# Patient Record
Sex: FEMALE | Race: WHITE | NOT HISPANIC OR LATINO | URBAN - METROPOLITAN AREA
[De-identification: names, ages, dates, MRNs, and addresses within clinical notes are randomized per-mention and may not be internally consistent; named-entity substitution may affect disease eponyms.]

---

## 2017-06-09 ENCOUNTER — OFFICE VISIT - HEALTHEAST (OUTPATIENT)
Dept: FAMILY MEDICINE | Facility: CLINIC | Age: 32
End: 2017-06-09

## 2017-06-09 DIAGNOSIS — N76.0 ACUTE VAGINITIS: ICD-10-CM

## 2017-06-09 ASSESSMENT — MIFFLIN-ST. JEOR: SCORE: 1330.01

## 2017-08-26 ENCOUNTER — OFFICE VISIT - HEALTHEAST (OUTPATIENT)
Dept: FAMILY MEDICINE | Facility: CLINIC | Age: 32
End: 2017-08-26

## 2017-08-26 DIAGNOSIS — N76.0 BACTERIAL VAGINOSIS: ICD-10-CM

## 2017-08-26 DIAGNOSIS — B96.89 BACTERIAL VAGINOSIS: ICD-10-CM

## 2017-08-26 DIAGNOSIS — J32.9 SINUSITIS: ICD-10-CM

## 2017-09-13 ENCOUNTER — COMMUNICATION - HEALTHEAST (OUTPATIENT)
Dept: FAMILY MEDICINE | Facility: CLINIC | Age: 32
End: 2017-09-13

## 2017-12-28 ENCOUNTER — OFFICE VISIT - HEALTHEAST (OUTPATIENT)
Dept: MIDWIFE SERVICES | Facility: CLINIC | Age: 32
End: 2017-12-28

## 2017-12-28 ENCOUNTER — COMMUNICATION - HEALTHEAST (OUTPATIENT)
Dept: MIDWIFE SERVICES | Facility: CLINIC | Age: 32
End: 2017-12-28

## 2017-12-28 DIAGNOSIS — B37.31 YEAST VAGINITIS: ICD-10-CM

## 2017-12-28 DIAGNOSIS — N89.8 VAGINAL IRRITATION: ICD-10-CM

## 2017-12-28 DIAGNOSIS — Z30.431 IUD CHECK UP: ICD-10-CM

## 2017-12-28 ASSESSMENT — MIFFLIN-ST. JEOR: SCORE: 1325.02

## 2018-03-13 ENCOUNTER — OFFICE VISIT - HEALTHEAST (OUTPATIENT)
Dept: FAMILY MEDICINE | Facility: CLINIC | Age: 33
End: 2018-03-13

## 2018-03-13 DIAGNOSIS — N76.0 BACTERIAL VAGINOSIS: ICD-10-CM

## 2018-03-13 DIAGNOSIS — B96.89 BACTERIAL VAGINOSIS: ICD-10-CM

## 2018-03-13 DIAGNOSIS — N89.8 VAGINAL DISCHARGE: ICD-10-CM

## 2018-03-13 LAB
CLUE CELLS: ABNORMAL
TRICHOMONAS, WET PREP: ABNORMAL
YEAST, WET PREP: ABNORMAL

## 2018-03-13 ASSESSMENT — MIFFLIN-ST. JEOR: SCORE: 1338.18

## 2018-03-17 LAB — BACTERIA SPEC CULT: NORMAL

## 2018-06-15 ENCOUNTER — COMMUNICATION - HEALTHEAST (OUTPATIENT)
Dept: FAMILY MEDICINE | Facility: CLINIC | Age: 33
End: 2018-06-15

## 2018-10-29 ENCOUNTER — RECORDS - HEALTHEAST (OUTPATIENT)
Dept: ADMINISTRATIVE | Facility: OTHER | Age: 33
End: 2018-10-29

## 2018-11-07 ENCOUNTER — OFFICE VISIT - HEALTHEAST (OUTPATIENT)
Dept: FAMILY MEDICINE | Facility: CLINIC | Age: 33
End: 2018-11-07

## 2018-11-07 DIAGNOSIS — Z00.00 ROUTINE GENERAL MEDICAL EXAMINATION AT A HEALTH CARE FACILITY: ICD-10-CM

## 2018-11-07 LAB
ANION GAP SERPL CALCULATED.3IONS-SCNC: 9 MMOL/L (ref 5–18)
BUN SERPL-MCNC: 12 MG/DL (ref 8–22)
CALCIUM SERPL-MCNC: 9.7 MG/DL (ref 8.5–10.5)
CHLORIDE BLD-SCNC: 105 MMOL/L (ref 98–107)
CHOLEST SERPL-MCNC: 190 MG/DL
CO2 SERPL-SCNC: 25 MMOL/L (ref 22–31)
CREAT SERPL-MCNC: 0.76 MG/DL (ref 0.6–1.1)
ERYTHROCYTE [DISTWIDTH] IN BLOOD BY AUTOMATED COUNT: 10.6 % (ref 11–14.5)
FASTING STATUS PATIENT QL REPORTED: YES
GFR SERPL CREATININE-BSD FRML MDRD: >60 ML/MIN/1.73M2
GLUCOSE BLD-MCNC: 86 MG/DL (ref 70–125)
HCT VFR BLD AUTO: 42.1 % (ref 35–47)
HDLC SERPL-MCNC: 90 MG/DL
HGB BLD-MCNC: 14.3 G/DL (ref 12–16)
LDLC SERPL CALC-MCNC: 91 MG/DL
MCH RBC QN AUTO: 31.6 PG (ref 27–34)
MCHC RBC AUTO-ENTMCNC: 34.1 G/DL (ref 32–36)
MCV RBC AUTO: 93 FL (ref 80–100)
PLATELET # BLD AUTO: 164 THOU/UL (ref 140–440)
PMV BLD AUTO: 7.7 FL (ref 7–10)
POTASSIUM BLD-SCNC: 4.4 MMOL/L (ref 3.5–5)
RBC # BLD AUTO: 4.53 MILL/UL (ref 3.8–5.4)
SODIUM SERPL-SCNC: 139 MMOL/L (ref 136–145)
TRIGL SERPL-MCNC: 43 MG/DL
TSH SERPL DL<=0.005 MIU/L-ACNC: 1.22 UIU/ML (ref 0.3–5)
WBC: 5.8 THOU/UL (ref 4–11)

## 2018-11-07 ASSESSMENT — MIFFLIN-ST. JEOR: SCORE: 1308.7

## 2018-11-08 LAB
25(OH)D3 SERPL-MCNC: 48.6 NG/ML (ref 30–80)
25(OH)D3 SERPL-MCNC: 48.6 NG/ML (ref 30–80)

## 2018-11-16 ENCOUNTER — OFFICE VISIT - HEALTHEAST (OUTPATIENT)
Dept: FAMILY MEDICINE | Facility: CLINIC | Age: 33
End: 2018-11-16

## 2018-11-16 DIAGNOSIS — J06.9 UPPER RESPIRATORY TRACT INFECTION, UNSPECIFIED TYPE: ICD-10-CM

## 2018-11-16 DIAGNOSIS — J01.90 ACUTE SINUSITIS WITH SYMPTOMS > 10 DAYS: ICD-10-CM

## 2019-03-18 ENCOUNTER — OFFICE VISIT - HEALTHEAST (OUTPATIENT)
Dept: FAMILY MEDICINE | Facility: CLINIC | Age: 34
End: 2019-03-18

## 2019-03-18 DIAGNOSIS — B96.89 ACUTE BACTERIAL SINUSITIS: ICD-10-CM

## 2019-03-18 DIAGNOSIS — J01.90 ACUTE BACTERIAL SINUSITIS: ICD-10-CM

## 2019-10-02 ENCOUNTER — ANESTHESIA - HEALTHEAST (OUTPATIENT)
Dept: SURGERY | Facility: CLINIC | Age: 34
End: 2019-10-02

## 2019-10-02 ENCOUNTER — SURGERY - HEALTHEAST (OUTPATIENT)
Dept: SURGERY | Facility: CLINIC | Age: 34
End: 2019-10-02

## 2019-10-02 ASSESSMENT — MIFFLIN-ST. JEOR: SCORE: 1481.07

## 2020-01-21 ENCOUNTER — OFFICE VISIT - HEALTHEAST (OUTPATIENT)
Dept: FAMILY MEDICINE | Facility: CLINIC | Age: 35
End: 2020-01-21

## 2020-01-21 ENCOUNTER — COMMUNICATION - HEALTHEAST (OUTPATIENT)
Dept: FAMILY MEDICINE | Facility: CLINIC | Age: 35
End: 2020-01-21

## 2020-01-21 DIAGNOSIS — H01.009 BLEPHARITIS, UNSPECIFIED LATERALITY, UNSPECIFIED TYPE: ICD-10-CM

## 2020-10-06 ENCOUNTER — COMMUNICATION - HEALTHEAST (OUTPATIENT)
Dept: FAMILY MEDICINE | Facility: CLINIC | Age: 35
End: 2020-10-06

## 2020-10-09 ENCOUNTER — AMBULATORY - HEALTHEAST (OUTPATIENT)
Dept: NURSING | Facility: CLINIC | Age: 35
End: 2020-10-09

## 2021-05-31 VITALS — BODY MASS INDEX: 19.26 KG/M2 | WEIGHT: 127.1 LBS | HEIGHT: 68 IN

## 2021-05-31 VITALS — WEIGHT: 128 LBS | BODY MASS INDEX: 19.46 KG/M2

## 2021-05-31 VITALS — WEIGHT: 128.2 LBS | BODY MASS INDEX: 19.43 KG/M2 | HEIGHT: 68 IN

## 2021-06-01 VITALS — HEIGHT: 68 IN | WEIGHT: 130 LBS | BODY MASS INDEX: 19.7 KG/M2

## 2021-06-01 NOTE — ANESTHESIA PROCEDURE NOTES
Spinal Block    Patient location during procedure: OR  Start time: 10/2/2019 9:42 AM  End time: 10/2/2019 9:46 AM  Reason for block: primary anesthetic    Staffing:  Performing  Anesthesiologist: Deric Hernandez MD    Preanesthetic Checklist  Completed: patient identified, risks, benefits, and alternatives discussed, timeout performed, consent obtained, airway assessed, oxygen available, suction available, emergency drugs available and hand hygiene performed  Spinal Block  Patient position: sitting  Prep: ChloraPrep  Patient monitoring: heart rate, cardiac monitor, continuous pulse ox and blood pressure  Approach: midline  Location: L2-3  Injection technique: single-shot  Needle type: pencil-tip   Needle gauge: 24 G

## 2021-06-01 NOTE — ANESTHESIA PREPROCEDURE EVALUATION
Anesthesia Evaluation      No history of anesthetic complications     Airway   Mallampati: II  Neck ROM: full   Pulmonary - negative ROS and normal exam                          Cardiovascular - normal exam   Neuro/Psych - negative ROS     Endo/Other    (+) pregnant     GI/Hepatic/Renal - negative ROS           Dental - normal exam                        Anesthesia Plan  Planned anesthetic: spinal    ASA 2     Anesthetic plan and risks discussed with: patient    Post-op plan: routine recovery    Plan placement of bilateral TAP blocks at conclusion of procedure for postoperative pain control per surgeon request.

## 2021-06-01 NOTE — ANESTHESIA CARE TRANSFER NOTE
Last vitals:   Vitals:    10/02/19 1058   BP: 122/61   Pulse: 78   Resp: 16   Temp: 36.4  C (97.5  F)   SpO2: 99%     Patient's level of consciousness is awake  Spontaneous respirations: yes  Maintains airway independently: yes  Dentition unchanged: yes  Oropharynx: oropharynx clear of all foreign objects    QCDR Measures:  ASA# 20 - Surgical Safety Checklist: WHO surgical safety checklist completed prior to induction    PQRS# 430 - Adult PONV Prevention: 4558F - Pt received => 2 anti-emetic agents (different classes) preop & intraop  ASA# 8 - Peds PONV Prevention: NA - Not pediatric patient, not GA or 2 or more risk factors NOT present  PQRS# 424 - Karina-op Temp Management: 4559F - At least one body temp DOCUMENTED => 35.5C or 95.9F within required timeframe  PQRS# 426 - PACU Transfer Protocol: - Transfer of care checklist used  ASA# 14 - Acute Post-op Pain: ASA14B - Patient did NOT experience pain >= 7 out of 10

## 2021-06-01 NOTE — ANESTHESIA POSTPROCEDURE EVALUATION
Patient: Yumiko John  REPEAT  SECTION BILATERAL TUBAL LIGATION  Anesthesia type: spinal    Patient location: Labor and Delivery  Last vitals:   Vitals Value Taken Time   /64 10/2/2019  1:00 PM   Temp 36.4  C (97.5  F) 10/2/2019 10:58 AM   Pulse 72 10/2/2019  1:00 PM   Resp 16 10/2/2019  1:00 PM   SpO2 98 % 10/2/2019  1:00 PM     Post vital signs: stable  Level of consciousness: awake and responds to simple questions  Post-anesthesia pain: pain controlled  Post-anesthesia nausea and vomiting: no  Pulmonary: unassisted, return to baseline  Cardiovascular: stable and blood pressure at baseline  Hydration: adequate  Anesthetic events: no    QCDR Measures:  ASA# 11 - Karina-op Cardiac Arrest: ASA11B - Patient did NOT experience unanticipated cardiac arrest  ASA# 12 - Karina-op Mortality Rate: ASA12B - Patient did NOT die  ASA# 13 - PACU Re-Intubation Rate: NA - No ETT / LMA used for case  ASA# 10 - Composite Anes Safety: ASA10A - No serious adverse event    Additional Notes:  Recovery as anticipated from spinal anesthetic.  No apparent complications.

## 2021-06-02 VITALS — BODY MASS INDEX: 19.73 KG/M2 | WEIGHT: 126 LBS

## 2021-06-02 VITALS — WEIGHT: 140 LBS | BODY MASS INDEX: 21.93 KG/M2

## 2021-06-02 VITALS — HEIGHT: 67 IN | WEIGHT: 127 LBS | BODY MASS INDEX: 19.93 KG/M2

## 2021-06-03 VITALS — HEIGHT: 67 IN | BODY MASS INDEX: 25.9 KG/M2 | WEIGHT: 165 LBS

## 2021-06-05 NOTE — PROGRESS NOTES
Provider E-Visit time total (minutes): 3 min    Diagnosis:  Blepharitis (inflammation of the hair follicles of the eye.      Plan:  Use eye drops as prescribed for 5-7 days (until clear).  If recurs or treatment unsuccessful will need to see eye doctor.      Marisabel Melendez MD 11:44 AM 1/22/2020

## 2021-06-12 NOTE — TELEPHONE ENCOUNTER
Who is calling:  Patient  Reason for Call:  Patient is coming with daughter for appointment with Jemima Voss on 10/09 at 8:30 am and mom is asking if she can get flu shot at same time as daughter's visit? Writer could not find any availability on drive up flu shot schedule. Please advise patient if ok.  Date of last appointment with primary care: na  Okay to leave a detailed message: Yes

## 2021-06-12 NOTE — PROGRESS NOTES
ASSESSMENT:   1. Bacterial vaginosis  Wet Prep, Vaginal    metroNIDAZOLE (FLAGYL) 500 MG tablet   2. Sinusitis  amoxicillin-clavulanate (AUGMENTIN) 875-125 mg per tablet        PLAN:  1 - Clue cells seen on wet prep.  Stop Monistat. Start Metronidazole. No alcohol while on this medication and for 2 days after completing. No sexual activity until symptoms resolved and treatment is completed.     2 - Suspect URI symptoms are viral at this time. Recommend trial of Sudafed 120m tablet every 12 hours as needed for congestion for 3-5 days.  May want to skip nighttime dose due to insomnia.  If not improving in 3-5 days or any worsening symptoms, may start Augmentin.      Follow up with primary care provider if not improving in 3-5 days, sooner if any worsening or new symptoms.         SUBJECTIVE:   Yumiko John is a 32 y.o. female presents today with 2 concerns:   1- Cold symptoms for 1 week.  Reports rhinorrhea, nasal congestion, cough, scratchy throat, hoarse voice, sinus pressure.  Had body aches initially but that has improved. Continuing to feel worse in terms of her sinus symptoms as the week has gone on.  Sick contacts: none. Has tried Dayquil without relief.   2- Vaginal discharge and mild itchiness x yesterday.  She tried Monistat last evening with temporary relief though this feels different than her previous yeast infections. Sexually active with  only. LMP: no longer gets a period since Mirena was placed.     Denies fever, chills, abdominal pain, nausea, vomiting.    Patient Active Problem List   Diagnosis     IUD contraception       History   Smoking Status     Never Smoker   Smokeless Tobacco     Never Used       Current Medications:  Current Outpatient Prescriptions on File Prior to Visit   Medication Sig Dispense Refill     ERGOCALCIFEROL, VITAMIN D2, (VITAMIN D2 ORAL) Take 2,000 Units by mouth daily.       levonorgestrel (MIRENA) 20 mcg/24 hr (5 years) IUD 1 each by Intrauterine route once.        MULTIVITAMIN ORAL Take by mouth.       prenatal vitamin iron-folic acid 27mg-0.8mg (PRENATAL S) 27 mg iron- 800 mcg Tab tablet Take 1 tablet by mouth daily.       No current facility-administered medications on file prior to visit.        Allergies:   No Known Allergies    OBJECTIVE:   Vitals:    08/26/17 1553   BP: 104/60   Patient Site: Right Arm   Patient Position: Sitting   Cuff Size: Adult Regular   Pulse: 74   Resp: 18   Temp: 97.7  F (36.5  C)   TempSrc: Oral   SpO2: 100%   Weight: 128 lb (58.1 kg)     Physical exam reveals a pleasant 32 y.o. female.   Appears healthy, alert, cooperative and in NAD.  Eyes:  No conjunctivitis, lids normal.   Ears:  normal TMs bilaterally  Nose:    Mucosa normal. Clear rhinorrhea.   Mouth:  Mucosa pink and moist.  no pharyngitis, no exudate and uvula is midline.    Lymph: no cervical LAD  Lungs: Chest is clear, no wheezing, rhonchi or rales. Symmetric air entry throughout both lung fields.  Heart: regular rate and rhythm, no murmur, rub or gallop  : External genitalia without lesions, rash, or erythema.  Vaginal mucosa pink and moist,rugae intact.  Vaginal discharge is thick, white.  Cervix is pink, os is closed, non-friable.  No CMT Uterus and ovaries are nontender.      Recent Results (from the past 24 hour(s))   Wet Prep, Vaginal   Result Value Ref Range    Yeast Result No yeast seen No yeast seen    Trichomonas No Trichomonas seen No Trichomonas seen    Clue Cells, Wet Prep Clue cells seen (!) No Clue cells seen

## 2021-06-15 NOTE — PROGRESS NOTES
"Assessment:     1.  Contraceptive management  2.  IUD check  3.  Vaginal irritation: Yeast vaginitis     Plan:      1.  Speculum exam.  2.  Wet prep obtained.  Patient declined GC/CT culture.    3.  Discussed the effect of the Mirena IUD and the possibility for an increase in vaginal infections.  4. Contraception: Mirena IUD  5.  Next pap due October 2021. HPV co-testing discussed with that pap, then paps q 5 yrs if both negative.  6.  RTC 10 months for annual well woman exam, PRN    Total time with patient: 25 minutes greater than 50% of which was spent counseling and coordinating care    Subjective:      Yumiko John is a 32 y.o. female who presents for an IUD string check and complaining of vaginal irritation/vaginal pruritus/unusual milky white vaginal discharge for a couple of days.  States that since receiving her Mirena IUD in November 2015, she has had about 3 episodes of vaginitis that have been easily treated with metronidazole.  Desires a wet prep today.  Wondering if trimming IUD strings may decrease the incidence of vaginitis.  Taking a multivitamin with probiotics within.   ×4 years.  Not concerned about infidelity.  Declines STD screening today.  When asked when her last menstrual period was, patient states: \"I spot twice a month, and I have not had a real period since before conceiving my son!\"  Denies dyspareunia.    Immunization History   Administered Date(s) Administered     HPV Quadrivalent 07/05/2007, 08/30/2007, 01/04/2008     Hep A, historic 05/19/2008, 12/23/2008     Influenza, inj, historic,unspecified 10/01/2013, 10/03/2016     Influenza,seasonal quad, PF, 36+MOS 09/19/2015     Meningococcal MCV4P 05/19/2008     Td,adult,historic,unspecified 06/01/2008     Tdap 05/19/2008, 06/26/2015     Immunization status: up to date and documented.    Gynecologic History  No LMP recorded (lmp unknown). Patient is not currently having periods (Reason: Contraception).  Contraception: IUD    Cancer " screening:   Last Pap: 2016. Results were: Within normal limits, negative HPV    OB History    Para Term  AB Living   1 1 1   1   SAB TAB Ectopic Multiple Live Births       1      # Outcome Date GA Lbr Lee/2nd Weight Sex Delivery Anes PTL Lv   1 Term 09/16/15 40w2d  9 lb 1 oz (4.111 kg) M CS-LTranv Spinal N BIBI      Birth Comments: Primary C-Birth for Breech presentation          Current Outpatient Prescriptions   Medication Sig Dispense Refill     cholecalciferol, vitamin D3, 400 unit/mL Drop drops Take 400 Units by mouth daily.       fluticasone (FLONASE) 50 mcg/actuation nasal spray 2 sprays into each nostril daily. 18 g 2     levonorgestrel (MIRENA) 20 mcg/24 hr (5 years) IUD 1 each by Intrauterine route once.       multivitamin Liqd solution Take 5 mL by mouth daily.       No current facility-administered medications for this visit.      History reviewed. No pertinent past medical history.  Past Surgical History:   Procedure Laterality Date     WI EXCIS THYROID DUCT CYST/SINUS      Description: Thyroglossal Duct Cyst Excision;  Recorded: 10/07/2008;     WISDOM TOOTH EXTRACTION       Review of patient's allergies indicates no known allergies.  Family History   Problem Relation Age of Onset     Breast cancer Maternal Aunt 34     Cancer Paternal Uncle      Lung cancer Maternal Grandfather      Breast cancer Other      great-aunt (maternal grandfather's sister), diagnosed prior to age 50     Lung cancer Other      great-uncle (maternal grandmother's brother)     Lymphoma Other      great-uncle (maternal grandmother's brother     Social History     Social History     Marital status:      Spouse name: Gilmar     Number of children: 1     Years of education: 16     Occupational History     RN [employer]     Social History Main Topics     Smoking status: Never Smoker     Smokeless tobacco: Never Used     Alcohol use No      Comment: 2- per week wine     Drug use: No     Sexual  "activity: Yes     Partners: Male     Other Topics Concern     Not on file     Social History Narrative       Review of Systems  General:  Denies problem  Genitourinary: See HPI please      Objective:         Vitals:    12/28/17 1230   BP: 102/60   Pulse: 60   Weight: 127 lb 1.6 oz (57.7 kg)   Height: 5' 8\" (1.727 m)       Physical Exam:  General Appearance: Alert, cooperative, no distress, appears stated age  Pelvic:External genitalia normal without lesions or irritation. Vagina and cervix show no lesions, inflammation, or tenderness. No cystocele, No rectocele. Uterus & adnexal normal without masses or tenderness.  Negative CMT.  Homogenous white  discharge on vaginal walls, cervix and posterior fornix.    Wet prep results: Yeast present, negative trichomoniasis, negative clue      "

## 2021-06-16 PROBLEM — O34.219 PREVIOUS CESAREAN DELIVERY, ANTEPARTUM CONDITION OR COMPLICATION: Status: ACTIVE | Noted: 2019-09-30

## 2021-06-16 NOTE — PROGRESS NOTES
"Assessment/Plan:         Visit Diagnoses     Bacterial vaginosis    -  Primary    Relevant Medications    metroNIDAZOLE (METROGEL VAGINAL) 0.75 % vaginal gel    Culture, Genital      We are going to treat with MetroGel vaginal twice daily for 5 days.  Given recurrent symptoms I have also collected a genital culture which is pending.  Her IUD may be contributing to her recurrent symptoms.  Her and her  are planning to start trying to conceive this summer so if symptoms are recurrent next step may be to remove her IUD.    Patient Instructions   Metrogel vaginal twice daily for 5 days.  We will notify you of other lab results when available.       Subjective:   Yumiko John is a 32 y.o. female who presents today complaining of vaginal itching and discharge.  She did have some irritation and pain with intercourse a couple of days ago.  There is some burning in the external area with urination.  No hematuria or frequency.      Patient Active Problem List   Diagnosis     IUD contraception     Vaginal irritation     IUD check up     Yeast vaginitis        No past medical history on file.     Past Surgical History:   Procedure Laterality Date     AL EXCIS THYROID DUCT CYST/SINUS      Description: Thyroglossal Duct Cyst Excision;  Recorded: 10/07/2008;     WISDOM TOOTH EXTRACTION            Current Outpatient Prescriptions:      levonorgestrel (MIRENA) 20 mcg/24 hr (5 years) IUD, 1 each by Intrauterine route once., Disp: , Rfl:      multivitamin Liqd solution, Take 5 mL by mouth daily., Disp: , Rfl:       Review of Systems     Objective:     Vitals:    03/13/18 1133   BP: 100/62   Patient Site: Left Arm   Patient Position: Sitting   Cuff Size: Adult Regular   Pulse: 65   Resp: 12   Temp: 98.5  F (36.9  C)   TempSrc: Oral   SpO2: 96%   Weight: 130 lb (59 kg)   Height: 5' 8\" (1.727 m)        Physical Exam   Constitutional: She appears well-nourished. No distress.   Genitourinary: There is no rash on the right labia. " There is no rash on the left labia. Vaginal discharge (thin, clear) found.   Genitourinary Comments: IUD strings visible and appropriate length.     Results for orders placed or performed in visit on 03/13/18   Wet Prep, Vaginal   Result Value Ref Range    Yeast Result No yeast seen No yeast seen    Trichomonas No Trichomonas seen No Trichomonas seen    Clue Cells, Wet Prep Clue cells seen (!) No Clue cells seen

## 2021-06-17 NOTE — PATIENT INSTRUCTIONS - HE
Patient Instructions by Devin Luke MD at 3/18/2019  4:10 PM     Author: Devin Luke MD Service: -- Author Type: Resident    Filed: 3/18/2019  5:09 PM Encounter Date: 3/18/2019 Status: Signed    : Devin Luke MD (Resident)       Patient Education     Acute Bacterial Rhinosinusitis (ABRS)    Acute bacterial rhinosinusitis (ABRS) is an infection of your nasal cavity and sinuses. Its caused by bacteria. Acute means that youve had symptoms for less than 4 weeks, but possibly up to 12 weeks.  Understanding your sinuses  The nasal cavity is the large air-filled space behind your nose. The sinuses are a group of spaces formed by the bones of your face. They connect with your nasal cavity. ABRS causes the tissue lining these spaces to become inflamed. Mucus may not drain normally. This leads to facial pain and other symptoms.  What causes ABRS?  ABRS most often follows an upper respiratory infection caused by a virus. Bacteria then infect the lining of your nasal cavity and sinuses. But you can also get ABRS if you have:    Nasal allergies    Long-term nasal swelling and congestion not caused by allergies    Blockage in the nose  Symptoms of ABRS  The symptoms of ABRS may be different for each person and include:    Nasal congestion or blockage    Pain or pressure in the face    Thick, colored drainage from the nose  Other symptoms may include:    Runny nose    Fluid draining from the nose down the throat (postnasal drip)    Headache    Cough    Pain    Fever  Diagnosing ABRS  ABRS may be diagnosed if youve had an upper respiratory infection like a cold and cough for 10 or more days without improvement or with worsening symptoms. Your healthcare provider will ask about your symptoms and your medical history. The provider will check your vital signs, including your temperature. Youll have a physical exam. The healthcare provider will check your ears, nose, and throat. You likely wont need any tests.  If ABRS comes back, you may have a culture or other tests.  Treatment for ABRS  Treatment may include:    Antibiotic medicine. This is for symptoms that last for at least 10 to 14 days.    Nasal corticosteroid medicine. Drops or spray used in the nose can lessen swelling and congestion.    Over-the-counter pain medicine. This is to lessen sinus pain and pressure.    Nasal decongestant medicine. Spray or drops may help to lessen congestion. Do not use them for more than a few days.    Salt wash (saline irrigation). This can help to loosen mucus.  Possible complications of ABRS  ABRS may come back or become long-term (chronic). In rare cases, ABRS may cause complications such as:     Inflamed tissue around the brain and spinal cord (meningitis)    Inflamed tissue around the eyes (orbital cellulitis)    Inflamed bones around the sinuses (osteitis)  These problems may need to be treated in a hospital with intravenous (IV) antibiotic medicine or surgery.  When to call the healthcare provider  Call your healthcare provider if you have any of the following:    Symptoms that dont get better, or get worse    Symptoms that dont get better after 3 to 5 days on antibiotics    Trouble seeing    Swelling around your eyes    Confusion or trouble staying awake   Date Last Reviewed: 5/1/2017 2000-2017 The "[employer]. [address] [place] [address], [place] [address]. All rights reserved. This information is not intended as a substitute for professional medical care. Always follow your healthcare professional's instructions.

## 2021-06-21 NOTE — PROGRESS NOTES
Yumiko John is a 33 y.o. female is here for a  Health Maintenance exam. Patient is overall doing well.  Patient is  1 para 1 with a healthy 3-year-old son who was delivered by  section for breech presentation.  She just had her Mirena removed by OB GYN and is hoping to become pregnant soon.  She works as an RN at [employer].  Her only concern is breast cancer: Maternal aunt  at age 34.  Patient did have genetic testing for BRCA 1 and 2 and was negative.  She just had a Pap smear done by her gynecologist when her Mirena was removed.    Healthy Habits:   Regular Exercise: Yes  Sunscreen Use: Yes  Healthy Diet: Yes  Dental Visits Regularly: Yes  Seat Belt: Yes  Sexually active: Yes  Self Breast Exam Monthly:Yes  Hemoccults: No  Flex Sig: No  Colonoscopy: No  Lipid Profile: Yes  Glucose Screen: Yes  Prevention of Osteoporosis: Yes  Last Dexa: No  Guns at Home:  Yes  Guns Safety Locks:  Yes  Domestic Violence:  No    Current Outpatient Medications Include:    Current Outpatient Prescriptions:      levonorgestrel (MIRENA) 20 mcg/24 hr (5 years) IUD, 1 each by Intrauterine route once., Disp: , Rfl:      multivitamin Liqd solution, Take 5 mL by mouth daily., Disp: , Rfl:     Allergies:  No Known Allergies    No past medical history on file.    Past Surgical History:   Procedure Laterality Date     NE EXCIS THYROID DUCT CYST/SINUS      Description: Thyroglossal Duct Cyst Excision;  Recorded: 10/07/2008;     WISDOM TOOTH EXTRACTION         OB History    Para Term  AB Living   1 1 1   1   SAB TAB Ectopic Multiple Live Births       1      # Outcome Date GA Lbr Lee/2nd Weight Sex Delivery Anes PTL Lv   1 Term 09/16/15 40w2d  9 lb 1 oz (4.111 kg) M CS-LTranv Spinal N BIBI      Birth Comments: Primary C-Birth for Breech presentation          Immunization History   Administered Date(s) Administered     HPV Quadrivalent 2007, 2007, 2008     Hep A, historic 2008, 2008      Influenza, inj, historic,unspecified 10/01/2013, 10/03/2016     Influenza,seasonal quad, PF, 36+MOS 09/19/2015     Meningococcal MCV4P 05/19/2008     Td,adult,historic,unspecified 06/01/2008     Tdap 05/19/2008, 06/26/2015       Family History   Problem Relation Age of Onset     Breast cancer Maternal Aunt 34     Cancer Paternal Uncle      Lung cancer Maternal Grandfather      Breast cancer Other      great-aunt (maternal grandfather's sister), diagnosed prior to age 50     Lung cancer Other      great-uncle (maternal grandmother's brother)     Lymphoma Other      great-uncle (maternal grandmother's brother       Social History     Social History     Marital status:      Spouse name: Gilmar     Number of children: 1     Years of education: 16     Occupational History     [employer]     Social History Main Topics     Smoking status: Never Smoker     Smokeless tobacco: Never Used     Alcohol use No      Comment: 2- per week wine     Drug use: No     Sexual activity: Yes     Partners: Male     Other Topics Concern     Not on file     Social History Narrative       Last cholesterol:   Lab Results   Component Value Date    CHOL 201 (H) 10/04/2016    CHOL 200 (H) 07/27/2012    CHOL 176 07/29/2010     Lab Results   Component Value Date    HDL 89 10/04/2016     07/27/2012    HDL 88 (H) 07/29/2010     Lab Results   Component Value Date    LDLCALC 102 10/04/2016    LDLCALC 88.0 07/27/2012    LDLCALC 77.4 07/29/2010     Lab Results   Component Value Date    TRIG 52 10/04/2016    TRIG 20 07/27/2012    TRIG 53 07/29/2010     No components found for: CHOLHDL          Birth Control Method: None  High Risk/Behavior: None      LMP: No LMP recorded. Patient is not currently having periods (Reason: Contraception).  Menstrual Regularity: Regular  Flow: Normal      Review of Systems:   General:  Denies fever, chills, HA, fatigue, myalgias, weight change    Eyes: Denies vision changes   Ears/Nose/Throat:  "Denies nasal congestion, rhinorrhea, ear pain or discharge, sore throat, swollen glands  Cardiovascular: Denies CP, palpitations  Respiratory:  Denies SOB, cough  Gastrointestinal:  Denies changes in bowel habits, melena, rectal bleeding,  Genitourinary: Denies changes in urine habits/frequency/dysuria, hematuria   Musculoskeletal:  Denies  joint pain or swelling or erythema, edema  Skin: Denies rashes   Neurologic: Denies weakness, paresthesia  Psychiatric: Denies mood changes   Endocrine: Denies polyuria, polydipsia, polyphagia  Heme/Lymphatic: Denies problem with bleeding   Allergic/Immunologic: Denies problem     POSITIVES: None, 112 point review of systems is entirely negative.      PHYSICAL EXAM:    BP 98/68  Pulse 60  Temp 98.4  F (36.9  C)  Ht 5' 7\" (1.702 m)  Wt 127 lb (57.6 kg)  LMP 11/05/2018  Breastfeeding? No Comment: spotting   BMI 19.89 kg/m2    Wt Readings from Last 3 Encounters:   03/13/18 130 lb (59 kg)   12/28/17 127 lb 1.6 oz (57.7 kg)   08/26/17 128 lb (58.1 kg)       Body mass index is 19.89 kg/(m^2).    Well developed, well nourished, no acute distress.  HEENT: normocephalic/atraumatic, PERRLA/EOMI, TMs: Gray, normal light reflex, no nasal discharge.  Oral mucosa: no erythema/exudate  Neck: No LAD/masses/thyromegaly/bruits  Lungs: clear bilaterally  Heart: regular rate and rhythm, no murmurs/gallops/rubs  Breasts: symmetric, no masses/skin changes, nipple discharge, or axillary LAD.  BSE reviewed.  Abdomen: Normal bowel sounds, soft, non-tender, non-distended, no masses, neg Ortiz's/McBurney's, no rebound/guarding  Genital: Deferred.  Pelvic and Pap smear done by gynecologist when Mirena was removed.  Rectal: Deferred  Lymphatics: no supraclavicular/axillary/epitrochlear/inguinal LAD. No edema.  Neuro: A&O x 3, CN II-XII intact, strength 5/5, reflexes symmetric, sensory intact to light touch.  Psych: Behavior appropriate, engaging.  Thought processes congruent, " non-tangential.  Musculoskeletal: Extremities normal, atraumatic, no swelling  Skin: no rashes or lesions.      Recent Results (from the past 240 hour(s))   Thyroid Stimulating Hormone (TSH)   Result Value Ref Range    TSH 1.22 0.30 - 5.00 uIU/mL   HM2(CBC w/o Differential)   Result Value Ref Range    WBC 5.8 4.0 - 11.0 thou/uL    RBC 4.53 3.80 - 5.40 mill/uL    Hemoglobin 14.3 12.0 - 16.0 g/dL    Hematocrit 42.1 35.0 - 47.0 %    MCV 93 80 - 100 fL    MCH 31.6 27.0 - 34.0 pg    MCHC 34.1 32.0 - 36.0 g/dL    RDW 10.6 (L) 11.0 - 14.5 %    Platelets 164 140 - 440 thou/uL    MPV 7.7 7.0 - 10.0 fL   Basic Metabolic Panel   Result Value Ref Range    Sodium 139 136 - 145 mmol/L    Potassium 4.4 3.5 - 5.0 mmol/L    Chloride 105 98 - 107 mmol/L    CO2 25 22 - 31 mmol/L    Anion Gap, Calculation 9 5 - 18 mmol/L    Glucose 86 70 - 125 mg/dL    Calcium 9.7 8.5 - 10.5 mg/dL    BUN 12 8 - 22 mg/dL    Creatinine 0.76 0.60 - 1.10 mg/dL    GFR MDRD Af Amer >60 >60 mL/min/1.73m2    GFR MDRD Non Af Amer >60 >60 mL/min/1.73m2   Lipid Cascade   Result Value Ref Range    Cholesterol 190 <=199 mg/dL    Triglycerides 43 <=149 mg/dL    HDL Cholesterol 90 >=50 mg/dL    LDL Calculated 91 <=129 mg/dL    Patient Fasting > 8hrs? Yes    Vitamin D, Total (25-Hydroxy)   Result Value Ref Range    Vitamin D, Total (25-Hydroxy) 48.6 30.0 - 80.0 ng/mL       ASSESSMENT/PLAN: 33 y.o. female physical exam and pap smear.          1. Routine general medical examination at a health care facility  Screening lab work as below.  Entirely normal exam.  - Thyroid Stimulating Hormone (TSH)  - HM2(CBC w/o Differential)  - Basic Metabolic Panel  - Lipid Cascade    There are no discontinued medications.    Routine health maintenance discussion:  No smoking, limited alcohol (7 or less servings per week), 5 fruits/veg servings per day, 200 minutes of exercise per week.  Daily calcium/vitamin D guidelines, bone health, yearly mammogram after age 39/regular pap  smears/colon cancer screening beginning at age 50.  Accident avoidance, sun screen.      Will contact her with the results of the labs when available.    Marisabel Melendez M.D.

## 2021-06-25 NOTE — PROGRESS NOTES
WALK IN CARE - VISIT NOTE    HPI    Yumiko John is a 33 y.o. 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    OBJECTIVE  Vitals:    03/18/19 1639   BP: 112/73   Pulse: 82   Resp: 16   Temp: 97.6  F (36.4  C)   SpO2: 98%       Physical Exam  General: No acute distress, sitting comfortable in chair  Eyes:  normal conjunctiva, normal sclera   Ears: ear canals patent, TM normal, external ears normal  Nose: moist mucosa, erythema, discharge blocking view of turbinates  Face: Tenderness to palpation along frontal and ethmoid sinus  Oral: moist oral mucosa  Neck: good ROM, supple  CV: RRR, distal and peripheral pulses in tact  Resp: CTA bilaterally, no wheezing, no rhonchi, no rhales, no respiratory distress  Neuro: moves all 4 extremities, no focal deficits noted  Extrem: no edema, no cyanosis, well-perfused      ASSESSMENT/PLAN  1. Acute bacterial sinusitis  Patient has failed conservative management and given persistent symptoms with worsening suspect there is a bacterial etiology.  Gave patient option to trial only Afrin spray for 3 days to check for improvement and provide antibiotic prescription if not improving, however she would like to proceed with both and treatment right away.  Did discuss using Afrin only short-term for up to 3 days.  Handout provided with instructions.  - amoxicillin (AMOXIL)  875 MG tablet; Take 1 tablet (875 mg total) by mouth 2 (two) times a day for 7 days.  Dispense: 14 tablet; Refill: 0  - oxymetazoline (AFRIN) 0.05 % nasal spray; 2 sprays into each nostril 2 (two) times a day.  Dispense: 15 mL; Refill: 0      Options for treatment and follow-up care were reviewed with the patient and/or guardian. Discussed symptoms in which to return to clinic sooner or go to the ER for evaluation. Yumiko John and/or guardian engaged in the decision making process and verbalized understanding of the options discussed and agreed with the final plan.    Devin Luke MD

## 2021-06-26 NOTE — PROGRESS NOTES
Progress Notes by Zeferino Henley PA-C at 11/16/2018  5:00 PM     Author: Zeferino Henley PA-C Service: -- Author Type: Physician Assistant    Filed: 11/16/2018  7:18 PM Encounter Date: 11/16/2018 Status: Signed    : Zeferino Henley PA-C (Physician Assistant)       Subjective:      Patient ID: Yumiko John is a 33 y.o. female.    Chief Complaint:    HPI  Yumiko John is a 33 y.o. female who presents today complaining of a two week history of acute onset facial frontal and maxillary pain and pressure that is radiating to the teeth and to the jaw.  Patient has a headache that is made worse with dependency.  Postnasal drainage.  Has had cold-like symptoms in the 2 weeks prior to onset of her symptoms. Denies fever chills night sweats epistaxis or other constitutional symptoms.      Has not tried any treatment for this at home.    No past medical history on file.    Past Surgical History:   Procedure Laterality Date   ? [place] EXCIS THYROID DUCT CYST/SINUS      Description: Thyroglossal Duct Cyst Excision;  Recorded: 10/07/2008;   ? WISDOM TOOTH EXTRACTION         Family History   Problem Relation Age of Onset   ? Breast cancer Maternal Aunt 34   ? Cancer Paternal Uncle    ? Lung cancer Maternal Grandfather    ? Breast cancer Other         great-aunt (maternal grandfather's sister), diagnosed prior to age 50   ? Lung cancer Other         great-uncle (maternal grandmother's brother)   ? Lymphoma Other         great-uncle (maternal grandmother's brother       Social History     Tobacco Use   ? Smoking status: Never Smoker   ? Smokeless tobacco: Never Used   Substance Use Topics   ? Alcohol use: Yes     Comment: 2- per week wine   ? Drug use: No       Review of Systems  As above in HPI otherwise negative.  Objective:     /70 (Patient Site: Right Arm, Patient Position: Sitting, Cuff Size: Adult Regular)   Pulse 63   Temp 97.7  F (36.5  C) (Oral)   Resp 14   Wt 126 lb (57.2 kg)   LMP 11/05/2018 Comment: IUD  SpO2  98%   Breastfeeding? No   BMI 19.73 kg/m      Physical Exam  General: Patient is resting comfortably no acute distress is afebrile  HEENT: Head is normocephalic atraumatic   Frontal and maxillary sinuses are tender to percussion  eyes are PERRL   EOMI sclera anicteric   TMs are clear bilaterally  Throat is with mild posterior pharyngeal wall exudate but no erythema  No cervical lymphadenopathy present  Lungs: Clear to auscultation bilaterally  Heart: Regular rate and rhythm  Skin: Without rash non-diaphoretic      Assessment:     Procedures    1. Acute sinusitis with symptoms > 10 days  amoxicillin (AMOXIL) 875 MG tablet   2. Upper respiratory tract infection, unspecified type       Plan:     1. Acute sinusitis with symptoms > 10 days  amoxicillin (AMOXIL) 875 MG tablet   2. Upper respiratory tract infection, unspecified type           Patient Instructions     Over-the-counter nasal steroid spray, follow packaging directions  Over-the-counter Mucinex, follow packaging directions  Hot packs 3 times per day to the forehead and face over the tender sinuses  Distal saline salt water or saline irrigation with [place] Ziegler  Antibiotic as written below.  Risks and benefits of the medication were gone over.  Indication for return to see urgent care or family practice provider for reevaluation and treatment.    As a result of our visit today, here are the action plans for you:    1. Medication(s) to stop: There are no discontinued medications.    2. Medication(s) to start or change:   Medications Ordered   Medications   ? amoxicillin (AMOXIL) 875 MG tablet     Sig: Take 1 tablet (875 mg total) by mouth 2 (two) times a day for 10 days.     Dispense:  20 tablet     Refill:  0       3. Other instructions: Yes      Acute Bacterial Rhinosinusitis (ABRS)  Acute bacterial rhinosinusitis (ABRS) is an infection of your nasal cavity and sinuses. Its caused by bacteria. Acute means that youve had symptoms for less than 12  weeks.  Understanding your sinuses  The nasal cavity is the large air-filled space behind your nose. The sinuses are a group of spaces formed by the bones of your face. They connect with your nasal cavity. ABRS causes the tissue lining these spaces to become inflamed. Mucus may not drain normally. This leads to facial pain and other symptoms.  What causes ABRS?  ABRS most often follows an upper respiratory infection caused by a virus. Bacteria then infect the lining of your nasal cavity and sinuses. But you can also get ABRS if you have:    Nasal allergies    Long-term nasal swelling and congestion not caused by allergies    Blockage in the nose  Symptoms of ABRS  The symptoms of ABRS may be different for each person, and can include:    Nasal congestion    Runny nose    Fluid draining from the nose down the throat (postnasal drip)    Headache    Cough    Pain in the sinuses    Thick, colored fluid from the nose (mucus)    Fever  Diagnosing ABRS  ABRS may be diagnosed if youve had an upper respiratory infection like a cold and cough for longer than 10 to 14 days. Your health care provider will ask about your symptoms and your medical history. The provider will check your vital signs, including your temperature. Youll have a physical exam. The health care provider will check your ears, nose, and throat. You likely wont need any tests. If ABRS comes back, you may have a culture or other tests.  Treatment for ABRS  Treatment may include:    Antibiotic medicine. This is for symptoms that last for at least 10 to 14 days.    Nasal corticosteroid medicine. Drops or spray used in the nose can lessen swelling and congestion.    Over-the-counter pain medicine. This is to lessen sinus pain and pressure.    Nasal decongestant medicine. Spray or drops may help to lessen congestion. Do not use them for more than a few days.    Salt wash (saline irrigation). This can help to loosen mucus.  Possible complications of ABRS  ABRS may  come back or become long-term (chronic).  In rare cases, ABRS may cause complications such as:     Inflamed tissue around the brain and spinal cord (meningitis)    Inflamed tissue around the eyes (orbital cellulitis)    Inflamed bones around the sinuses (osteitis)  These problems may need to be treated in a hospital with intravenous (IV) antibiotic medicine or surgery.  When to call the health care provider  Call your health care provider if you have any of the following:    Symptoms that dont get better, or get worse    Symptoms that dont get better after 3 to 5 days on antibiotics    Trouble seeing    Swelling around your eyes    Confusion or trouble staying awake   Date Last Reviewed: 3/3/2015    7456-2058 [employer]. [address] [place] [address], [place] [address]. All rights reserved. This information is not intended as a substitute for professional medical care. Always follow your healthcare professional's instructions.

## 2021-06-27 ENCOUNTER — HEALTH MAINTENANCE LETTER (OUTPATIENT)
Age: 36
End: 2021-06-27

## 2021-07-03 NOTE — ADDENDUM NOTE
Addendum Note by Milly Singleton MD at 9/14/2017  8:26 AM     Author: Milly Singleton MD Service: -- Author Type: Physician    Filed: 9/14/2017  8:26 AM Encounter Date: 9/13/2017 Status: Signed    : Milly Singleton MD (Physician)    Addended by: MILLY SINGLETON on: 9/14/2017 08:26 AM        Modules accepted: Orders

## 2021-07-14 PROBLEM — Z30.431 IUD CHECK UP: Status: RESOLVED | Noted: 2017-12-28 | Resolved: 2018-11-07

## 2021-10-17 ENCOUNTER — HEALTH MAINTENANCE LETTER (OUTPATIENT)
Age: 36
End: 2021-10-17

## 2022-01-04 NOTE — ANESTHESIA PROCEDURE NOTES
Peripheral Block    Patient location during procedure: OR  Start time: 10/2/2019 10:41 AM  End time: 10/2/2019 10:46 AM  post-op analgesia per surgeon order as noted in medical record  Preanesthetic Checklist  Completed: patient identified, site marked, risks, benefits, and alternatives discussed, timeout performed, consent obtained, airway assessed, oxygen available, suction available, emergency drugs available and hand hygiene performed  Peripheral Block  Block type: other, TAP  Prep: ChloraPrep  Patient position: supine  Patient monitoring: cardiac monitor, continuous pulse oximetry, heart rate and blood pressure  Laterality: bilateral, same technique used bilaterally  Injection technique: ultrasound guided    Ultrasound used to visualize needle placement in proximity to nerve being blocked: yes   Permanent ultrasound image captured for medical record  Sterile gel and probe cover used for ultrasound.    Needle  Needle type: Stimuplex   Needle gauge: 20G  Needle length: 4 in    Assessment  Injection assessment: no difficulty with injection, negative aspiration for heme, no paresthesia on injection and incremental injection           Please see instruction sheet from Dr. Bland

## 2022-07-23 ENCOUNTER — HEALTH MAINTENANCE LETTER (OUTPATIENT)
Age: 37
End: 2022-07-23

## 2022-10-01 ENCOUNTER — HEALTH MAINTENANCE LETTER (OUTPATIENT)
Age: 37
End: 2022-10-01

## 2023-03-09 NOTE — PROGRESS NOTES
Assessment/Plan:     1. Acute vaginitis  Wet prep was negative at this point however patient is now asymptomatic.  Could have been the yeast that she treated with Monistat.  IUD strings were somewhat shortened and there seemed to be some local irritation so I did use a swab to coax the strings out a little longer.  They are now about a centimeter and a half in length.  Patient will continue to monitor.  Recommend follow-up if symptoms worsen or do not improve.  - Wet Prep, Vaginal      Subjective:      Yumiko John is a 31 y.o. female comes in today with complaint of vaginal itching.  She states that she first noticed this a few weeks ago.  She states that there was a slight odor but she would not describe it as fishy.  She states there was some discharge that was white saw dried in her undergarments.  She had a bit of itching.  She states she has never had a yeast or any other type of infection before.  She used over-the-counter Monistat and her symptoms resolved.  She also noticed that she had a little bit of blood tinged discharge but that was very minimal.  She has a Mirena IUD in place which is monitored by her gynecologist.  Has been in there for a year and a half.  She does not typically get periods.  She states she is in a monogamous relationship with her  and has no concerns for sexually transmitted infections and declines the test today.  She feels well today no other new concerns.  My first time meeting with her so briefly reviewed past history and last visit note from primary care provider.    Current Outpatient Prescriptions   Medication Sig Dispense Refill     ERGOCALCIFEROL, VITAMIN D2, (VITAMIN D2 ORAL) Take 2,000 Units by mouth daily.       levonorgestrel (MIRENA) 20 mcg/24 hr (5 years) IUD 1 each by Intrauterine route once.       MULTIVITAMIN ORAL Take by mouth.       prenatal vitamin iron-folic acid 27mg-0.8mg (PRENATAL S) 27 mg iron- 800 mcg Tab tablet Take 1 tablet by mouth daily.    "    No current facility-administered medications for this visit.        Past Medical History, Family History, and Social History reviewed.  No past medical history on file.  Past Surgical History:   Procedure Laterality Date     [place] EXCIS THYROID DUCT CYST/SINUS      Description: Thyroglossal Duct Cyst Excision;  Recorded: 10/07/2008;     Review of patient's allergies indicates no known allergies.  Family History   Problem Relation Age of Onset     Breast cancer Maternal Aunt 34     Cancer Paternal Uncle      Lung cancer Maternal Grandfather      Breast cancer Other      great-aunt (maternal grandfather's sister), diagnosed prior to age 50     Lung cancer Other      great-uncle (maternal grandmother's brother)     Lymphoma Other      great-uncle (maternal grandmother's brother     Social History     Social History     Marital status:      Spouse name: N/A     Number of children: N/A     Years of education: N/A     Occupational History     Not on file.     Social History Main Topics     Smoking status: Never Smoker     Smokeless tobacco: Never Used     Alcohol use No     Drug use: No     Sexual activity: Yes     Partners: Male     Other Topics Concern     Not on file     Social History Narrative         Review of systems is as stated in HPI, and the remainder of the 10 system review is otherwise negative.    Objective:     Vitals:    06/09/17 1535   BP: 104/60   Patient Site: Right Arm   Patient Position: Sitting   Cuff Size: Adult Regular   Pulse: 72   Weight: 128 lb 3.2 oz (58.2 kg)   Height: 5' 8\" (1.727 m)    Body mass index is 19.49 kg/(m^2).    General Appearance:    Alert, cooperative, no distress, appears stated age   Head:    Normocephalic, without obvious abnormality, atraumatic   Eyes:    PERRL   Ears:    Normal external ear canals   Nose:   Mucosa normal, no drainage      Throat:   Oropharynx is clear   Neck:   Supple, symmetrical, no adenopathy       Lungs:     Clear to auscultation bilaterally, " respirations unlabored   Chest Wall:    No tenderness or deformity    Heart:    Regular rate and rhythm, S1 and S2 normal, no murmur, rub    or gallop       Abdomen      Pelvic:     Soft, non-tender, bowel sounds active all four quadrants,     no masses, no organomegaly    There is no pelvic lymphadenopathy.  Normal external genitalia, uterus adnexa are small mobile nontender.  Vaginal vault is normal.  The cervix is visualized with IUD strings in place although they are slightly shorter than typical.  There is a small amount of bleeding coming just from the cervical office, perhaps where the strings are irritating it.  There is also a clear jellylike mucus with just a tinge of white but no other significant discharge.  There is no significant cervical motion tenderness.             Extremities:   Extremities normal, atraumatic, no cyanosis or edema   Pulses:   2+ and symmetric all extremities   Skin:   No rashes or lesions         This note has been dictated using voice recognition software. Any grammatical or context distortions are unintentional and inherent to the the software.    5

## 2023-04-20 ENCOUNTER — PATIENT OUTREACH (OUTPATIENT)
Dept: CARE COORDINATION | Facility: CLINIC | Age: 38
End: 2023-04-20

## 2023-05-14 ENCOUNTER — HEALTH MAINTENANCE LETTER (OUTPATIENT)
Age: 38
End: 2023-05-14